# Patient Record
Sex: MALE | Race: WHITE | Employment: STUDENT | URBAN - NONMETROPOLITAN AREA
[De-identification: names, ages, dates, MRNs, and addresses within clinical notes are randomized per-mention and may not be internally consistent; named-entity substitution may affect disease eponyms.]

---
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
## 2020-01-09 NOTE — TELEPHONE ENCOUNTER
Last Appointment:  8/15/2019  No future appointments.      Patient and mom asking for a refill on zoloft 50mg one a day to [facility]  rx pended for review

## 2020-03-09 ENCOUNTER — OFFICE VISIT (OUTPATIENT)
Dept: FAMILY MEDICINE CLINIC | Age: 20
End: 2020-03-09
Payer: COMMERCIAL

## 2020-03-09 VITALS — HEART RATE: 86 BPM | SYSTOLIC BLOOD PRESSURE: 124 MMHG | DIASTOLIC BLOOD PRESSURE: 72 MMHG | OXYGEN SATURATION: 98 %

## 2020-03-09 PROCEDURE — G8427 DOCREV CUR MEDS BY ELIG CLIN: HCPCS | Performed by: INTERNAL MEDICINE

## 2020-03-09 PROCEDURE — 2022F DILAT RTA XM EVC RTNOPTHY: CPT | Performed by: INTERNAL MEDICINE

## 2020-03-09 PROCEDURE — 99213 OFFICE O/P EST LOW 20 MIN: CPT | Performed by: INTERNAL MEDICINE

## 2020-03-09 PROCEDURE — 1036F TOBACCO NON-USER: CPT | Performed by: INTERNAL MEDICINE

## 2020-03-09 PROCEDURE — 3046F HEMOGLOBIN A1C LEVEL >9.0%: CPT | Performed by: INTERNAL MEDICINE

## 2020-03-09 PROCEDURE — G8419 CALC BMI OUT NRM PARAM NOF/U: HCPCS | Performed by: INTERNAL MEDICINE

## 2020-03-09 PROCEDURE — G8484 FLU IMMUNIZE NO ADMIN: HCPCS | Performed by: INTERNAL MEDICINE

## 2020-03-09 RX ORDER — SERTRALINE HYDROCHLORIDE 100 MG/1
100 TABLET, FILM COATED ORAL DAILY
Qty: 90 TABLET | Refills: 1 | Status: SHIPPED
Start: 2020-03-09 | End: 2020-12-28 | Stop reason: SDUPTHER

## 2020-03-09 ASSESSMENT — PATIENT HEALTH QUESTIONNAIRE - PHQ9
SUM OF ALL RESPONSES TO PHQ QUESTIONS 1-9: 0
2. FEELING DOWN, DEPRESSED OR HOPELESS: 0
1. LITTLE INTEREST OR PLEASURE IN DOING THINGS: 0
SUM OF ALL RESPONSES TO PHQ9 QUESTIONS 1 & 2: 0
SUM OF ALL RESPONSES TO PHQ QUESTIONS 1-9: 0

## 2020-03-09 NOTE — PROGRESS NOTES
[id number] [facility] [address] [facility]     3/9/20  Loli Maldonado : 2000 Sex: male  Age: 23 y.o. Chief Complaint   Patient presents with    Anxiety    Diabetes       HPI    Presents today for follow-up visit on his medical problems. We did start him on sertraline last visit for anxiety/stress and this he states has helped him but was asking about raising the dose for potentially better response. I told him we would go from 50 to 100 mg daily and see how he does over the next month to 6 weeks. .  States his blood sugars have been under pretty good control. He follows with endocrine is a type I diabetic on insulin pump. States he did have labs done about 3 months ago through endocrine which I will obtain so as not to have to draw further labs today. depending on what was obtained I told him we might have to bring him back to check a couple things. Review of Systems     Constitutional: Negative for activity change, appetite change, chills, diaphoresis, fatigue, fever and unexpected weight change. HENT: Negative for congestion, ear pain, hearing loss, postnasal drip, rhinorrhea, sinus pain and sore throat. Eyes: Negative. Respiratory: Negative for cough, shortness of breath and wheezing. Does have history of what sounds like exercise-induced asthma which has been quiescent. Cardiovascular: Negative for chest pain, palpitations and leg swelling. Gastrointestinal: Negative for abdominal pain, blood in stool, constipation, diarrhea, nausea and vomiting. Endocrine: Negative for cold intolerance, heat intolerance, polydipsia, polyphagia and polyuria. Type 1 diabetes -sees  endocrine and is controlled  Genitourinary: Negative for difficulty urinating, dysuria, frequency, hematuria and urgency. Musculoskeletal: . Negative for back pain, gait problem and myalgias. Skin: Negative. Allergic/Immunologic: Negative for environmental allergies and immunocompromised state.    Neurological: Negative for dizziness, weakness, light-headedness, numbness and headaches. Hematological: Negative. Psychiatric/Behavioral: Negative for dysphoric mood. Has had anxiety and stress especially with school and test taking. REST OF PERTINENT ROS GONE OVER AND WAS NEGATIVE. Current Outpatient Medications:     sertraline (ZOLOFT) 50 MG tablet, Take 1 tablet by mouth daily, Disp: 30 tablet, Rfl: 0    insulin lispro (HUMALOG) 100 UNIT/ML injection vial, USE UP TO 70 UNITS DAILY AS DIRECTED VIA INSULIN PUMP, Disp: , Rfl:     mupirocin (BACTROBAN) 2 % ointment, Apply topically 2 times daily Apply topically 3 times daily. , Disp: , Rfl:     saccharomyces boulardii ([employer] DIGESTIVE PROBIOTIC) 250 MG capsule, Take 250 mg by mouth daily, Disp: , Rfl:     insulin aspart (NOVOLOG) 100 UNIT/ML injection vial, Inject into the skin, Disp: , Rfl:     diphenhydrAMINE (BENADRYL ALLERGY) 25 MG capsule, Take 25 mg by mouth nightly as needed for Itching, Disp: , Rfl:     Loratadine (CLARITIN PO), Take by mouth, Disp: , Rfl:     Vitamin D, Cholecalciferol, 1000 units CAPS, Take 1,000 Units by mouth Daily, Disp: , Rfl:     Insulin Pump - Insulin regular, Inject 1 Units/hr into the skin continuous, Disp: , Rfl:     naproxen (NAPROSYN) 500 MG tablet, Take 1 tablet by mouth 2 times daily (with meals), Disp: 20 tablet, Rfl: 0  No Known Allergies    Past Medical History:   Diagnosis Date    Diabetes mellitus ([facility] [address]     Type 1 diabetes mellitus without complication ([facility] [address] 7/85/9298     Past Surgical History:   Procedure Laterality Date    TONSILLECTOMY       No family history on file.   Social History     Socioeconomic History    Marital status: Single     Spouse name: Not on file    Number of children: Not on file    Years of education: Not on file    Highest education level: Not on file   Occupational History    Not on file   Social Needs    Financial resource strain: Not on file    Food insecurity     Worry: diabetes. Diagnoses and all orders for this visit:    Anxiety    Type 1 diabetes mellitus without complication ([facility] [address]    Plan: I will see her back in 6 months and as needed. Will attempt to get lab work from his endocrinologist Dr. William Lott. Double the dose of his sertraline from 50 to 100 mg. He is to call in about a month to 6 weeks with an update on this. We will see back tentatively in 6 months and as needed. See above body report. Notify us with problems in the interim. Return in about 6 months (around 9/9/2020). Seen By:  Mayo Mcdaniels MD      *Document was created using voice recognition software. Note was reviewed however may contain grammatical errors.

## 2020-12-01 ENCOUNTER — TELEPHONE (OUTPATIENT)
Dept: CHIROPRACTIC MEDICINE | Age: 20
End: 2020-12-01

## 2020-12-01 NOTE — TELEPHONE ENCOUNTER
Left message x2 for patient to reschedule 12/10/20 appointment.  NOT IN OFFICE 12/10/20.  12/10/20 Appointment Cancelled.

## 2020-12-21 ENCOUNTER — OFFICE VISIT (OUTPATIENT)
Dept: CHIROPRACTIC MEDICINE | Age: 20
End: 2020-12-21
Payer: COMMERCIAL

## 2020-12-21 VITALS
HEART RATE: 69 BPM | TEMPERATURE: 98.1 F | WEIGHT: 190 LBS | BODY MASS INDEX: 28.14 KG/M2 | HEIGHT: 69 IN | OXYGEN SATURATION: 98 % | RESPIRATION RATE: 14 BRPM

## 2020-12-21 PROCEDURE — 98940 CHIROPRACT MANJ 1-2 REGIONS: CPT | Performed by: CHIROPRACTOR

## 2020-12-21 PROCEDURE — 97014 ELECTRIC STIMULATION THERAPY: CPT | Performed by: CHIROPRACTOR

## 2020-12-21 NOTE — PROGRESS NOTES
20  Kelsi Juan Manuel : 2000 Sex: male  Age: 21 y.o. Chief Complaint   Patient presents with    Back Pain       HPI:   Bryant returns today. Have not seen him in several months. He was away at college. He states he did not participate in sports this past fall she dealt with thoracic outlet syndrome. He went through extensive physical therapy. Did not need surgery. Today, he is presenting for care of tightness and stiffness between the shoulder blades and lower back. Just feels tight all over. He has not been doing much exercise or lifting since he got home around [facility]. 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 Current Outpatient Medications:     sertraline (ZOLOFT) 100 MG tablet, Take 1 tablet by mouth daily, Disp: 90 tablet, Rfl: 1    insulin lispro (HUMALOG) 100 UNIT/ML injection vial, USE UP TO 70 UNITS DAILY AS DIRECTED VIA INSULIN PUMP, Disp: , Rfl:     mupirocin (BACTROBAN) 2 % ointment, Apply topically 2 times daily Apply topically 3 times daily. , Disp: , Rfl:     saccharomyces boulardii ([employer] DIGESTIVE PROBIOTIC) 250 MG capsule, Take 250 mg by mouth daily, Disp: , Rfl:     insulin aspart (NOVOLOG) 100 UNIT/ML injection vial, Inject into the skin, Disp: , Rfl:     diphenhydrAMINE (BENADRYL ALLERGY) 25 MG capsule, Take 25 mg by mouth nightly as needed for Itching, Disp: , Rfl:     Loratadine (CLARITIN PO), Take by mouth, Disp: , Rfl:     Vitamin D, Cholecalciferol, 1000 units CAPS, Take 1,000 Units by mouth Daily, Disp: , Rfl:     Insulin Pump - Insulin regular, Inject 1 Units/hr into the skin continuous, Disp: , Rfl:     Exam:   Vitals:    20 0804   Pulse: 69   Resp: 14   Temp: 98.1 °F (36.7 °C)   SpO2: 98% He appears well. No apparent distress. Alert and oriented x3. Ambulates without difficulty. Mild loss of thoracic extension and bilateral rotation is noted without pain. There is no midline pain in spinal regions with palpation. Trigger points noted bilateral rhomboids. There are hypertonic and tender fibers noted today in the thoracic and lumbar paraspinal muscles. Joint fixation is noted with motion screening at T4-7, T12-L1, L4-5. Nelson was seen today for back pain. Diagnoses and all orders for this visit:    Bilateral low back pain without sciatica, unspecified chronicity    Panniculitis affecting regions of neck and back, thoracic region    Muscle spasm of back        Treatment Plan:  EMS with heat to the interscapular region for 15 minutes to address muscle spasm/hypertension and alleviate pain. Diversified manipulation to the listed thoracic and lumbar segments. Tolerated well. He can follow-up with me as needed for further treatment.       Seen By:  Oscar Apple DC

## 2020-12-28 ENCOUNTER — OFFICE VISIT (OUTPATIENT)
Dept: FAMILY MEDICINE CLINIC | Age: 20
End: 2020-12-28
Payer: COMMERCIAL

## 2020-12-28 VITALS
WEIGHT: 197.6 LBS | DIASTOLIC BLOOD PRESSURE: 74 MMHG | SYSTOLIC BLOOD PRESSURE: 122 MMHG | HEART RATE: 80 BPM | BODY MASS INDEX: 29.27 KG/M2 | TEMPERATURE: 98 F | HEIGHT: 69 IN

## 2020-12-28 PROCEDURE — G8482 FLU IMMUNIZE ORDER/ADMIN: HCPCS | Performed by: INTERNAL MEDICINE

## 2020-12-28 PROCEDURE — 99395 PREV VISIT EST AGE 18-39: CPT | Performed by: INTERNAL MEDICINE

## 2020-12-28 RX ORDER — SERTRALINE HYDROCHLORIDE 100 MG/1
TABLET, FILM COATED ORAL
Qty: 90 TABLET | Refills: 1 | Status: SHIPPED
Start: 2020-12-28 | End: 2021-09-20 | Stop reason: SDUPTHER

## 2020-12-29 NOTE — PROGRESS NOTES
[address] [facility] [employer]      20  Norris Plan : 2000 Sex: male  Age: 21 y.o. Chief Complaint   Patient presents with    Anxiety    Diabetes     Type I       HPI  Patient presents today for his annual complete/preventative examination. I reviewed last couple notes. Last visit we did double the dose of his sertraline from 50 to 100 mg for better control of his anxiety. Patient has been very noncompliant with taking the medication. He states he is going to try and be more compliant and wants to get back on this again as he states the stress has been much more significant recently. I told him to start the sertraline at 50 mg for 2 weeks then back up to 100 mg. He states he is following with endocrinology (Dr. Ayse Clarke related to his type 1 diabetes and tells me last hemoglobin A1c was in the mid 7 range. He goes up to [facility] to see them. Review of Systems     Constitutional: Negative for activity change, appetite change, chills, diaphoresis, fatigue, fever and unexpected weight change. HENT: Negative for congestion, ear pain, hearing loss, postnasal drip, rhinorrhea, sinus pain and sore throat.    Eyes: Negative.    Respiratory: Negative for cough, shortness of breath and wheezing.         Does have history of what sounds like exercise-induced asthma which has been quiescent.   Cardiovascular: Negative for chest pain, palpitations and leg swelling. Gastrointestinal: Negative for abdominal pain, blood in stool, constipation, diarrhea, nausea and vomiting. Endocrine: Negative for cold intolerance, heat intolerance, polydipsia, polyphagia and polyuria.        Type 1 diabetes -sees  endocrine and is controlled  Genitourinary: Negative for difficulty urinating, dysuria, frequency, hematuria and urgency. Musculoskeletal: .  Negative for back pain, gait problem and myalgias.         Skin: Negative.   Allergic/Immunologic: Negative for environmental allergies and immunocompromised state. Neurological: Negative for dizziness, weakness, light-headedness, numbness and headaches. Hematological: Negative.    Psychiatric/Behavioral: Negative for dysphoric mood. Has had anxiety and stress especially with school and test taking.         REST OF PERTINENT ROS GONE OVER AND WAS NEGATIVE. Current Outpatient Medications:     sertraline (ZOLOFT) 100 MG tablet, 1/2 pill po x 2 weeks then 1 pill daily, Disp: 90 tablet, Rfl: 1    insulin lispro (HUMALOG) 100 UNIT/ML injection vial, USE UP TO 70 UNITS DAILY AS DIRECTED VIA INSULIN PUMP, Disp: , Rfl:     mupirocin (BACTROBAN) 2 % ointment, Apply topically 2 times daily Apply topically 3 times daily. , Disp: , Rfl:     saccharomyces boulardii ([employer]S DIGESTIVE PROBIOTIC) 250 MG capsule, Take 250 mg by mouth daily, Disp: , Rfl:     diphenhydrAMINE (BENADRYL ALLERGY) 25 MG capsule, Take 25 mg by mouth nightly as needed for Itching, Disp: , Rfl:     Loratadine (CLARITIN PO), Take by mouth, Disp: , Rfl:     Vitamin D, Cholecalciferol, 1000 units CAPS, Take 1,000 Units by mouth Daily, Disp: , Rfl:     Insulin Pump - Insulin regular, Inject 1 Units/hr into the skin continuous, Disp: , Rfl:   No Known Allergies    Past Medical History:   Diagnosis Date    Anxiety     [facility] mellitus ([facility] [address]     Exercise-induced asthma     Type 1 diabetes mellitus without complication ([facility] [address] 15/42/2781     Past Surgical History:   Procedure Laterality Date    TONSILLECTOMY       Family History   Problem Relation Age of Onset    Rectal Cancer Mother     Hypothyroidism Mother     Depression Mother     Colon Polyps Father      Social History     Socioeconomic History    Marital status: Single     Spouse name: Not on file    Number of children: Not on file    Years of education: Not on file    Highest education level: Not on file   Occupational History    Not on file Social Needs    Financial resource strain: Not on file    Food insecurity     Worry: Not on file     Inability: Not on file    Transportation needs     Medical: Not on file     Non-medical: Not on file   Tobacco Use    Smoking status: Never Smoker    Smokeless tobacco: Never Used   Substance and Sexual Activity    Alcohol use: Not on file    Drug use: Not on file    Sexual activity: Not on file   Lifestyle    Physical activity     Days per week: Not on file     Minutes per session: Not on file    Stress: Not on file   Relationships    Social connections     Talks on phone: Not on file     Gets together: Not on file     Attends Druze service: Not on file     Active member of club or organization: Not on file     Attends meetings of clubs or organizations: Not on file     Relationship status: Not on file    Intimate partner violence     Fear of current or ex partner: Not on file     Emotionally abused: Not on file     Physically abused: Not on file     Forced sexual activity: Not on file   Other Topics Concern    Not on file   Social History Narrative    Not on file       Vitals:    12/28/20 1506   BP: 122/74   Pulse: 80   Temp: 98 °F (36.7 °C)   TempSrc: Temporal   Weight: 197 lb 9.6 oz (89.6 kg)   Height: 5' 9\" (1.753 m)       Physical Exam  Vitals signs and nursing note reviewed. Constitutional:       General: He is not in acute distress. Appearance: He is well-developed. He is not ill-appearing. HENT:      Head: Normocephalic and atraumatic. Right Ear: Tympanic membrane, ear canal and external ear normal. There is no impacted cerumen. Left Ear: Tympanic membrane, ear canal and external ear normal. There is no impacted cerumen. Nose: Nose normal.      Mouth/Throat:      Mouth: Mucous membranes are moist.      Pharynx: Oropharynx is clear. No oropharyngeal exudate or posterior oropharyngeal erythema. Eyes:      Extraocular Movements: Extraocular movements intact. Conjunctiva/sclera: Conjunctivae normal.      Pupils: Pupils are equal, round, and reactive to light. Neck:      Musculoskeletal: Normal range of motion and neck supple. Thyroid: No thyromegaly. Cardiovascular:      Rate and Rhythm: Normal rate and regular rhythm. Heart sounds: Normal heart sounds. No murmur. No friction rub. No gallop. Pulmonary:      Effort: Pulmonary effort is normal. No respiratory distress. Breath sounds: Normal breath sounds. No wheezing, rhonchi or rales. Abdominal:      General: Bowel sounds are normal. There is no distension. Palpations: Abdomen is soft. There is no mass. Tenderness: There is no abdominal tenderness. Musculoskeletal: Normal range of motion. General: No swelling or tenderness. Lymphadenopathy:      Cervical: No cervical adenopathy. Skin:     General: Skin is warm and dry. Findings: No erythema or rash. Neurological:      General: No focal deficit present. Mental Status: He is alert and oriented to person, place, and time. Cranial Nerves: No cranial nerve deficit. Sensory: No sensory deficit. Motor: No weakness or abnormal muscle tone. Coordination: Coordination normal.      Gait: Gait normal.      Deep Tendon Reflexes: Reflexes normal.   Psychiatric:         Mood and Affect: Mood normal.         Behavior: Behavior normal.         Thought Content: Thought content normal.         Judgment: Judgment normal.                 Assessment and Plan:  Nelson was seen today for anxiety and diabetes. Diagnoses and all orders for this visit:    Routine general medical examination at a health care facility  -     CBC Auto Differential; Future  -     Comprehensive Metabolic Panel; Future  -     Lipid Panel; Future    Type 1 diabetes mellitus without complication (HCC)  -     TSH without Reflex;  Future    Anxiety    Other orders -     sertraline (ZOLOFT) 100 MG tablet; 1/2 pill po x 2 weeks then 1 pill daily    Plan: I will see back in 6 weeks for virtual video visit with the restarting and increasing of his sertraline. Blood work to monitor disease progression and medication use. Prescription management performed. Follow with his endocrinologist.  Will attempt to get some labs from her. Continue to monitor his eyes and feet. Notify us with problems in the interim. I did fill out paperwork for dad mom and patient associated with his dad's work. Return in about 6 weeks (around 2/8/2021) for VVV. Seen By:  William Mcneill MD      *Document was created using voice recognition software. Note was reviewed however may contain grammatical errors.

## 2021-03-26 ENCOUNTER — IMMUNIZATION (OUTPATIENT)
Dept: PRIMARY CARE CLINIC | Age: 21
End: 2021-03-26
Payer: COMMERCIAL

## 2021-03-26 PROCEDURE — 0011A COVID-19, MODERNA VACCINE 100MCG/0.5ML DOSE: CPT | Performed by: PHYSICIAN ASSISTANT

## 2021-03-26 PROCEDURE — 91301 COVID-19, MODERNA VACCINE 100MCG/0.5ML DOSE: CPT | Performed by: PHYSICIAN ASSISTANT

## 2021-04-23 ENCOUNTER — IMMUNIZATION (OUTPATIENT)
Dept: PRIMARY CARE CLINIC | Age: 21
End: 2021-04-23
Payer: COMMERCIAL

## 2021-04-23 PROCEDURE — 0012A COVID-19, MODERNA VACCINE 100MCG/0.5ML DOSE: CPT | Performed by: PHYSICIAN ASSISTANT

## 2021-04-23 PROCEDURE — 91301 COVID-19, MODERNA VACCINE 100MCG/0.5ML DOSE: CPT | Performed by: PHYSICIAN ASSISTANT

## 2021-08-02 ENCOUNTER — OFFICE VISIT (OUTPATIENT)
Dept: CHIROPRACTIC MEDICINE | Age: 21
End: 2021-08-02
Payer: COMMERCIAL

## 2021-08-02 VITALS — HEART RATE: 61 BPM | OXYGEN SATURATION: 98 % | RESPIRATION RATE: 14 BRPM | TEMPERATURE: 98.7 F

## 2021-08-02 DIAGNOSIS — M62.830 MUSCLE SPASM OF BACK: ICD-10-CM

## 2021-08-02 DIAGNOSIS — M54.04 PANNICULITIS AFFECTING REGIONS OF NECK AND BACK, THORACIC REGION: ICD-10-CM

## 2021-08-02 DIAGNOSIS — M54.50 BILATERAL LOW BACK PAIN WITHOUT SCIATICA, UNSPECIFIED CHRONICITY: Primary | ICD-10-CM

## 2021-08-02 PROCEDURE — 97014 ELECTRIC STIMULATION THERAPY: CPT | Performed by: CHIROPRACTOR

## 2021-08-02 PROCEDURE — 98940 CHIROPRACT MANJ 1-2 REGIONS: CPT | Performed by: CHIROPRACTOR

## 2021-08-02 NOTE — PROGRESS NOTES
21  Melisa Connolly : 2000 Sex: male  Age: 24 y.o. Chief Complaint   Patient presents with    Back Pain     tightness prsent today       HPI:   Bryant returns today for continued care. 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 He went on a long bike ride the other day and is now having some tightness in the thoracolumbar area per his identification. No new accidents or injuries. No radiating pain to the extremities. No changes otherwise. Current Outpatient Medications:     sertraline (ZOLOFT) 100 MG tablet, 1/2 pill po x 2 weeks then 1 pill daily, Disp: 90 tablet, Rfl: 1    insulin lispro (HUMALOG) 100 UNIT/ML injection vial, USE UP TO 70 UNITS DAILY AS DIRECTED VIA INSULIN PUMP, Disp: , Rfl:     mupirocin (BACTROBAN) 2 % ointment, Apply topically 2 times daily Apply topically 3 times daily. , Disp: , Rfl:     saccharomyces boulardii ([employer] DIGESTIVE PROBIOTIC) 250 MG capsule, Take 250 mg by mouth daily, Disp: , Rfl:     diphenhydrAMINE (BENADRYL ALLERGY) 25 MG capsule, Take 25 mg by mouth nightly as needed for Itching, Disp: , Rfl:     Loratadine (CLARITIN PO), Take by mouth, Disp: , Rfl:     Vitamin D, Cholecalciferol, 1000 units CAPS, Take 1,000 Units by mouth Daily, Disp: , Rfl:     Insulin Pump - Insulin regular, Inject 1 Units/hr into the skin continuous, Disp: , Rfl:     Exam:   Vitals:    21 1028   Pulse: 61   Resp: 14   Temp: 98.7 °F (37.1 °C)   SpO2: 98%         There are hypertonic and tender fibers noted today in the thoracic and lumbar paraspinal muscles. Midline pain joint fixation is noted with motion screening at T7-8, T11-L1, L4-5. He appears well otherwise. No acute distress. Ambulates without assistance. Nonantalgic. Miguel Bassett was seen today for back pain.     Diagnoses and all orders for this visit:    Bilateral low back pain without sciatica, unspecified chronicity    Panniculitis affecting regions of neck and back, thoracic region    Muscle spasm of back        Treatment Plan:  EMS with heat to the thoracolumbar region for 15 minutes to address muscle spasm/hypertension and alleviate pain. Diversified manipulation to the listed thoracic, lumbar segments. He is leaving to go back to school this weekend.   He can follow-up with me as needed for further care        Seen By:  Anaid Grider

## 2021-09-21 RX ORDER — SERTRALINE HYDROCHLORIDE 100 MG/1
TABLET, FILM COATED ORAL
Qty: 30 TABLET | Refills: 0 | Status: SHIPPED
Start: 2021-09-21 | End: 2022-08-24 | Stop reason: SDUPTHER

## 2021-09-21 NOTE — TELEPHONE ENCOUNTER
Sent my chart message/voicemail asking pt to make an appointment. Last Appointment:  12/28/2020  No future appointments.

## 2021-12-13 LAB — DIABETIC RETINOPATHY: NEGATIVE

## 2021-12-23 ENCOUNTER — OFFICE VISIT (OUTPATIENT)
Dept: FAMILY MEDICINE CLINIC | Age: 21
End: 2021-12-23
Payer: COMMERCIAL

## 2021-12-23 VITALS
WEIGHT: 200 LBS | SYSTOLIC BLOOD PRESSURE: 135 MMHG | TEMPERATURE: 97.1 F | RESPIRATION RATE: 18 BRPM | DIASTOLIC BLOOD PRESSURE: 75 MMHG | BODY MASS INDEX: 29.62 KG/M2 | OXYGEN SATURATION: 99 % | HEART RATE: 60 BPM | HEIGHT: 69 IN

## 2021-12-23 DIAGNOSIS — J30.2 SEASONAL ALLERGIC RHINITIS, UNSPECIFIED TRIGGER: Primary | ICD-10-CM

## 2021-12-23 LAB
Lab: NORMAL
Lab: NORMAL
PERFORMING INSTRUMENT: NORMAL
QC PASS/FAIL: NORMAL
QC PASS/FAIL: NORMAL
SARS-COV-2 RDRP RESP QL NAA+PROBE: NEGATIVE
SARS-COV-2, POC: NORMAL

## 2021-12-23 PROCEDURE — 99213 OFFICE O/P EST LOW 20 MIN: CPT | Performed by: STUDENT IN AN ORGANIZED HEALTH CARE EDUCATION/TRAINING PROGRAM

## 2021-12-23 PROCEDURE — G8427 DOCREV CUR MEDS BY ELIG CLIN: HCPCS | Performed by: STUDENT IN AN ORGANIZED HEALTH CARE EDUCATION/TRAINING PROGRAM

## 2021-12-23 PROCEDURE — 1036F TOBACCO NON-USER: CPT | Performed by: STUDENT IN AN ORGANIZED HEALTH CARE EDUCATION/TRAINING PROGRAM

## 2021-12-23 PROCEDURE — G8419 CALC BMI OUT NRM PARAM NOF/U: HCPCS | Performed by: STUDENT IN AN ORGANIZED HEALTH CARE EDUCATION/TRAINING PROGRAM

## 2021-12-23 PROCEDURE — 87426 SARSCOV CORONAVIRUS AG IA: CPT | Performed by: STUDENT IN AN ORGANIZED HEALTH CARE EDUCATION/TRAINING PROGRAM

## 2021-12-23 PROCEDURE — 87635 SARS-COV-2 COVID-19 AMP PRB: CPT | Performed by: STUDENT IN AN ORGANIZED HEALTH CARE EDUCATION/TRAINING PROGRAM

## 2021-12-23 PROCEDURE — G8484 FLU IMMUNIZE NO ADMIN: HCPCS | Performed by: STUDENT IN AN ORGANIZED HEALTH CARE EDUCATION/TRAINING PROGRAM

## 2021-12-23 RX ORDER — FLUTICASONE PROPIONATE 50 MCG
2 SPRAY, SUSPENSION (ML) NASAL DAILY
Qty: 16 G | Refills: 0 | Status: SHIPPED
Start: 2021-12-23 | End: 2022-08-24 | Stop reason: ALTCHOICE

## 2021-12-23 ASSESSMENT — ENCOUNTER SYMPTOMS
SORE THROAT: 1
DIARRHEA: 0
SINUS PAIN: 0
SHORTNESS OF BREATH: 0
VOMITING: 0
COUGH: 0
NAUSEA: 0
RHINORRHEA: 0

## 2021-12-23 NOTE — PROGRESS NOTES
WALK-IN CARE CLINIC VISIT    21  Name: Valery Morrissey   : 2000   Age: 24 y.o. Sex: male        Assessment & Plan:       ICD-10-CM    1. Seasonal allergic rhinitis, unspecified trigger  J30.2 POCT COVID-19, Antigen     POCT COVID-19, Rapid     fluticasone (FLONASE) 50 MCG/ACT nasal spray       No evidence of acute bacterial infection. History and exam consistent with viral vs allergic rhinitis. COVID PCR negative. Provided Flonase for symptomatic relief. Advised to use his Claritin daily. Advised to call if signs of bacterial sinus infection including fever or facial pain. Provided supportive care instructions, return precautions. Counseled patient regarding above diagnosis, including possible risks and complications, especially if left uncontrolled. Counseled patient as appropriate and relevant regarding any possible side effects, risks, and alternatives to treatment; the patient and/or guardian verbalizes understanding, and is in agreement with the plan as detailed above. All educational materials and instructions were discussed and included on the After Visit Summary. All questions answered to the patient's satisfaction. The patient was advised to call for any concerns or return if any of the signs or symptoms worsen. The patient was advised to follow-up with PCP in the next 2-3 days for repeat evaluation. This provider was wearing N95 mask and shield. The patient was wearing surgical mask. We practiced social distancing when appropriate during visit due to COVID-19 pandemic.      Subjective:     Chief Complaint   Patient presents with    Congestion     Sick since Thanksgiving     Laryngitis       Patient presents with worsening congestion and voice change this week after having had URI symptoms for about 1 month  Has history of allergies, not currently taking anything  Denies known sick contacts but was at [employer] over weekend    Review of Systems   Constitutional: Negative Allergies    Social History:     Social History     Socioeconomic History    Marital status: Single     Spouse name: Not on file    Number of children: Not on file    Years of education: Not on file    Highest education level: Not on file   Occupational History    Not on file   Tobacco Use    Smoking status: Never Smoker    Smokeless tobacco: Never Used   Substance and Sexual Activity    Alcohol use: Not on file    Drug use: Not on file    Sexual activity: Not on file   Other Topics Concern    Not on file   Social History Narrative    Not on file     Social Determinants of Health     Financial Resource Strain:     Difficulty of Paying Living Expenses: Not on file   Food Insecurity:     Worried About Running Out of Food in the Last Year: Not on file    Sybil of Food in the Last Year: Not on file   Transportation Needs:     Lack of Transportation (Medical): Not on file    Lack of Transportation (Non-Medical):  Not on file   Physical Activity:     Days of Exercise per Week: Not on file    Minutes of Exercise per Session: Not on file   Stress:     Feeling of Stress : Not on file   Social Connections:     Frequency of Communication with Friends and Family: Not on file    Frequency of Social Gatherings with Friends and Family: Not on file    Attends Worship Services: Not on file    Active Member of [address] [place] [address] [employer] or Organizations: Not on file    Attends Club or Organization Meetings: Not on file    Marital Status: Not on file   Intimate Partner Violence:     Fear of Current or Ex-Partner: Not on file    Emotionally Abused: Not on file    Physically Abused: Not on file    Sexually Abused: Not on file   Housing Stability:     Unable to Pay for Housing in the Last Year: Not on file    Number of [place] in the Last Year: Not on file    Unstable Housing in the Last Year: Not on file       Physical Exam:     Vitals:    12/23/21 1203 12/23/21 1256   BP: (!) 162/98 135/75   Pulse: 60    Resp: 18 Temp: 97.1 °F (36.2 °C)    SpO2: 99%    Weight: 200 lb (90.7 kg)    Height: 5' 9\" (1.753 m)         Physical Exam  Vitals and nursing note reviewed. Constitutional:       General: He is not in acute distress. Appearance: Normal appearance. He is not ill-appearing or diaphoretic. HENT:      Right Ear: Tympanic membrane, ear canal and external ear normal.      Left Ear: Tympanic membrane, ear canal and external ear normal.      Nose: Mucosal edema and congestion present. No rhinorrhea. Right Sinus: No maxillary sinus tenderness or frontal sinus tenderness. Left Sinus: No maxillary sinus tenderness or frontal sinus tenderness. Mouth/Throat:      Mouth: Mucous membranes are moist.      Pharynx: Oropharynx is clear. No oropharyngeal exudate or posterior oropharyngeal erythema. Eyes:      Extraocular Movements: Extraocular movements intact. Conjunctiva/sclera: Conjunctivae normal.      Pupils: Pupils are equal, round, and reactive to light. Cardiovascular:      Rate and Rhythm: Normal rate and regular rhythm. Heart sounds: Normal heart sounds. Pulmonary:      Effort: Pulmonary effort is normal. No respiratory distress. Breath sounds: Normal breath sounds. No wheezing, rhonchi or rales. Skin:     General: Skin is warm and dry. Neurological:      Mental Status: He is alert and oriented to person, place, and time. Testing:     Orders Placed This Encounter   Procedures    POCT COVID-19, Antigen     Order Specific Question:   Is this test for diagnosis or screening? Answer:   Diagnosis of ill patient     Order Specific Question:   Symptomatic for COVID-19 as defined by CDC? Answer:   Yes     Order Specific Question:   Date of Symptom Onset     Answer:   12/20/2021     Order Specific Question:   Hospitalized for COVID-19? Answer:   No     Order Specific Question:   Admitted to ICU for COVID-19?      Answer:   No     Order Specific Question:   Pregnant:

## 2022-08-12 NOTE — TELEPHONE ENCOUNTER
Last Appointment:  12/28/2020  Future Appointments   Date Time Provider Gabriel Larry   8/24/2022  3:15 PM Balwinder Duke  [address] [place] [address]

## 2022-08-24 ENCOUNTER — OFFICE VISIT (OUTPATIENT)
Dept: FAMILY MEDICINE CLINIC | Age: 22
End: 2022-08-24
Payer: COMMERCIAL

## 2022-08-24 VITALS
OXYGEN SATURATION: 97 % | WEIGHT: 192.4 LBS | DIASTOLIC BLOOD PRESSURE: 80 MMHG | HEIGHT: 69 IN | BODY MASS INDEX: 28.5 KG/M2 | HEART RATE: 66 BPM | TEMPERATURE: 98 F | SYSTOLIC BLOOD PRESSURE: 136 MMHG

## 2022-08-24 DIAGNOSIS — E10.9 TYPE 1 DIABETES MELLITUS WITHOUT COMPLICATION (HCC): ICD-10-CM

## 2022-08-24 DIAGNOSIS — E55.9 VITAMIN D DEFICIENCY: ICD-10-CM

## 2022-08-24 DIAGNOSIS — F41.9 ANXIETY: ICD-10-CM

## 2022-08-24 DIAGNOSIS — Z92.29 HISTORY OF REGULAR MEDICATION USE: ICD-10-CM

## 2022-08-24 PROCEDURE — G8419 CALC BMI OUT NRM PARAM NOF/U: HCPCS | Performed by: INTERNAL MEDICINE

## 2022-08-24 PROCEDURE — G8427 DOCREV CUR MEDS BY ELIG CLIN: HCPCS | Performed by: INTERNAL MEDICINE

## 2022-08-24 PROCEDURE — 99213 OFFICE O/P EST LOW 20 MIN: CPT | Performed by: INTERNAL MEDICINE

## 2022-08-24 PROCEDURE — 3046F HEMOGLOBIN A1C LEVEL >9.0%: CPT | Performed by: INTERNAL MEDICINE

## 2022-08-24 PROCEDURE — 2022F DILAT RTA XM EVC RTNOPTHY: CPT | Performed by: INTERNAL MEDICINE

## 2022-08-24 PROCEDURE — 1036F TOBACCO NON-USER: CPT | Performed by: INTERNAL MEDICINE

## 2022-08-24 RX ORDER — SERTRALINE HYDROCHLORIDE 100 MG/1
TABLET, FILM COATED ORAL
Qty: 90 TABLET | Refills: 1 | Status: SHIPPED | OUTPATIENT
Start: 2022-08-24

## 2022-08-24 RX ORDER — ALPRAZOLAM 0.25 MG/1
0.25 TABLET ORAL DAILY PRN
Qty: 30 TABLET | Refills: 0 | Status: SHIPPED | OUTPATIENT
Start: 2022-08-24 | End: 2022-09-23

## 2022-08-24 ASSESSMENT — PATIENT HEALTH QUESTIONNAIRE - PHQ9
SUM OF ALL RESPONSES TO PHQ QUESTIONS 1-9: 0
2. FEELING DOWN, DEPRESSED OR HOPELESS: 0
1. LITTLE INTEREST OR PLEASURE IN DOING THINGS: 0
SUM OF ALL RESPONSES TO PHQ QUESTIONS 1-9: 0
SUM OF ALL RESPONSES TO PHQ9 QUESTIONS 1 & 2: 0

## 2022-08-24 NOTE — PROGRESS NOTES
[facility]     22  Latisha Keene : 2000 Sex: male  Age: 25 y.o. Chief Complaint   Patient presents with    Anxiety       HPI    Patient presents today for additional visit complaining of increasing anxiety. He was on sertraline but stopped up until about 6 to 8 weeks ago and then went back on the medication although he states medication was old he does notice improvement being back on the medication but I told him we would probably need to get him a fresh supply of the sertraline. 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 He is very anxious about all of this and has had a couple panic attacks related to all of this. I did discuss with him staying on his sertraline on a regular basis and I would add limited supply of Xanax which is only going to be short-term to get him through the next several weeks until he can adjust.  I did review the potential side effects of the medication. He was agreeable to this. His diabetes has been stable and controlled on his insulin pump with last A1c of 6.8. He is up-to-date with endocrinology. Review of Systems  Constitutional: Negative for activity change, appetite change, chills, diaphoresis, fatigue, fever and unexpected weight change. HENT: Negative for congestion, ear pain, hearing loss, postnasal drip, rhinorrhea, sinus pain and sore throat. Eyes: Negative. Respiratory: Negative for cough, shortness of breath and wheezing. Does have history of what sounds like exercise-induced asthma which has been quiescent. Cardiovascular: Negative for chest pain, palpitations and leg swelling. Gastrointestinal: Negative for abdominal pain, blood in stool, constipation, diarrhea, nausea and vomiting. Endocrine: Negative for cold intolerance, heat intolerance, polydipsia, polyphagia and polyuria.         Type 1 diabetes -sees  endocrine and is controlled  Genitourinary: Negative for difficulty urinating, dysuria, frequency, hematuria and urgency. Musculoskeletal: . Negative for back pain, gait problem and myalgias. Skin: Negative. Allergic/Immunologic: Negative for environmental allergies and immunocompromised state. Neurological: Negative for dizziness, weakness, light-headedness, numbness and headaches. Hematological: Negative. Psychiatric/Behavioral: Negative for dysphoric mood. Has had anxiety and stress especially with school and test taking. --See above           REST OF PERTINENT ROS GONE OVER AND WAS NEGATIVE. Current Outpatient Medications:     sertraline (ZOLOFT) 100 MG tablet, 1 pill daily, Disp: 90 tablet, Rfl: 1    ALPRAZolam (XANAX) 0.25 MG tablet, Take 1 tablet by mouth daily as needed for Anxiety for up to 30 days. , Disp: 30 tablet, Rfl: 0    insulin lispro (HUMALOG) 100 UNIT/ML injection vial, USE UP TO 70 UNITS DAILY AS DIRECTED VIA INSULIN PUMP, Disp: , Rfl:     Insulin Pump - Insulin regular, Inject 1 Units/hr into the skin continuous, Disp: , Rfl:   No Known Allergies    Past Medical History:   Diagnosis Date    Anxiety     Diabetes mellitus ([facility] [address]     Exercise-induced asthma     Type 1 diabetes mellitus without complication ([facility] [address] 21/35/2627     Past Surgical History:   Procedure Laterality Date    TONSILLECTOMY       Family History   Problem Relation Age of Onset    Rectal Cancer Mother     Hypothyroidism Mother     Depression Mother     Colon Polyps Father      Social History     Socioeconomic History    Marital status: Single     Spouse name: Not on file    Number of children: Not on file    Years of education: Not on file    Highest education level: Not on file   Occupational History    Not on file   Tobacco Use    Smoking status: Never    Smokeless tobacco: Never   Substance and Sexual Activity    Alcohol use: Not on file    Drug use: Not on file    Sexual activity: Not on file   Other Topics Concern    Not on file Social History Narrative    Not on file     Social Determinants of Health     Financial Resource Strain: Not on file   Food Insecurity: Not on file   Transportation Needs: Not on file   Physical Activity: Not on file   Stress: Not on file   Social Connections: Not on file   Intimate Partner Violence: Not on file   Housing Stability: Not on file       Vitals:    08/24/22 1527   BP: 136/80   Pulse: 66   Temp: 98 °F (36.7 °C)   TempSrc: Temporal   SpO2: 97%   Weight: 192 lb 6.4 oz (87.3 kg)   Height: 5' 9\" (1.753 m)       Physical Exam  Vitals signs and nursing note reviewed. Constitutional:       General: He is not in acute distress. Appearance: He is well-developed. He is not ill-appearing. Neck:      Musculoskeletal: Normal range of motion and neck supple. Thyroid: No thyromegaly. Cardiovascular:      Rate and Rhythm: Normal rate and regular rhythm. Heart sounds: Normal heart sounds. No murmur. No friction rub. No gallop. Pulmonary:      Effort: Pulmonary effort is normal. No respiratory distress. Breath sounds: Normal breath sounds. No wheezing, rhonchi or rales. Abdominal:      General: Bowel sounds are normal. There is no distension. Palpations: Abdomen is soft. There is no mass. Tenderness: There is no abdominal tenderness. Musculoskeletal: Normal range of motion. General: No swelling or tenderness. Skin:     General: Skin is warm and dry. Findings: No erythema or rash. He did have a couple tiny lipomatous lumps to left arm. Nontender. Psychiatric:         Mood and Affect: Mood normal.         Behavior: Behavior normal.         Thought Content: Thought content normal.         Judgment: Judgment normal.                      Assessment and Plan:  Nelson was seen today for anxiety. Diagnoses and all orders for this visit:    Anxiety  -     ALPRAZolam (XANAX) 0.25 MG tablet; Take 1 tablet by mouth daily as needed for Anxiety for up to 30 days.     Type 1 diabetes mellitus without complication ([facility] [address]    History of regular medication use  -     CBC with Auto Differential; Future  -     Comprehensive Metabolic Panel; Future    Vitamin D deficiency  -     Vitamin D 25 Hydroxy; Future    Other orders  -     sertraline (ZOLOFT) 100 MG tablet; 1 pill daily    Plan: See above body report. I did add as needed Xanax. Warned of potential side effects. OARRS report was run and okay. Blood work today. Set him up for 1 year follow-up with new physician. Notify with problems in the interim. Return in about 1 year (around 8/24/2023). Seen By:  Norah Whitten MD      *Document was created using voice recognition software. Note was reviewed however may contain grammatical errors.

## 2023-03-15 ENCOUNTER — TELEPHONE (OUTPATIENT)
Dept: FAMILY MEDICINE CLINIC | Age: 23
End: 2023-03-15

## 2023-03-15 NOTE — TELEPHONE ENCOUNTER
I will do so if he will set up with new physician in the next 1 to 2 months. He probably needs medication adjustments.

## 2023-04-17 NOTE — TELEPHONE ENCOUNTER
Last Appointment:  8/24/2022  Future Appointments   Date Time Provider Gabriel Larry   5/26/2023  8:30 AM Ara Kapadia  [address] [facility] [address]

## 2023-04-17 NOTE — TELEPHONE ENCOUNTER
Mother sent the following Torbit message:    From: Pauly Peters  To: Dr. Afia Chamorro: 4/17/2023  3:50 PM EDT  Subject: Nato Coreas,   my son Natalie Barboza has been in [place] [id number] in [place]. We tried calling a month or so to get him in for appt while he is on break, but soonest available was in May (when he is in [place]); He is out of his Sertraline, and was doing fairly well on it.   Is there anyway you can a refill it at least once until we can find him a primary Dr.  Think we may try to find him one in [place]

## 2023-04-18 RX ORDER — SERTRALINE HYDROCHLORIDE 100 MG/1
TABLET, FILM COATED ORAL
Qty: 90 TABLET | Refills: 0 | Status: SHIPPED | OUTPATIENT
Start: 2023-04-18

## 2023-04-18 NOTE — TELEPHONE ENCOUNTER
3-month supply was sent. Needs to establish with somebody where he is. In looking on the chart he has not had labs done for 16 months and that was ordered by his endocrinologist.  He is a type I diabetic. Stress compliance with this.

## 2023-04-18 NOTE — TELEPHONE ENCOUNTER
Can we confirm if patient is establishing/going to see my as his primary?  If not establishing with me and if I've never seen him likely won't be able to rx

## 2023-07-14 RX ORDER — SERTRALINE HYDROCHLORIDE 100 MG/1
TABLET, FILM COATED ORAL
Qty: 90 TABLET | Refills: 0 | OUTPATIENT
Start: 2023-07-14

## 2024-02-29 NOTE — TELEPHONE ENCOUNTER
Addended by: PAT JENSEN on: 2/29/2024 02:13 PM     Modules accepted: Orders     Regarding: Emotional support animal for school  ----- Message from Doug Wakefield sent at 3/15/2023  2:27 PM EDT -----       ----- Message from Luz Maria Blanco to Semaj Scott MD sent at 3/15/2023  2:20 PM -----   Good afternoon, I was just reaching out wondering if youd be willing to fill out a form for an emotional support animal for me up at school. Yoly Holder been having a really hard time and have been regularly taking my meds but my anxiety has been terrible and I think I may be slightly depressed. If you are willing I can send the form but if not, no worries.       Thank you,        Lulu Yarbrough